# Patient Record
Sex: MALE | Race: BLACK OR AFRICAN AMERICAN | Employment: FULL TIME | URBAN - METROPOLITAN AREA
[De-identification: names, ages, dates, MRNs, and addresses within clinical notes are randomized per-mention and may not be internally consistent; named-entity substitution may affect disease eponyms.]

---

## 2024-06-28 ENCOUNTER — APPOINTMENT (OUTPATIENT)
Dept: CT IMAGING | Age: 39
End: 2024-06-28

## 2024-06-28 ENCOUNTER — HOSPITAL ENCOUNTER (EMERGENCY)
Age: 39
Discharge: HOME | End: 2024-06-28
Attending: EMERGENCY MEDICINE

## 2024-06-28 VITALS
HEART RATE: 61 BPM | BODY MASS INDEX: 33.06 KG/M2 | TEMPERATURE: 97.8 F | SYSTOLIC BLOOD PRESSURE: 134 MMHG | RESPIRATION RATE: 16 BRPM | WEIGHT: 280 LBS | OXYGEN SATURATION: 98 % | HEIGHT: 77 IN | DIASTOLIC BLOOD PRESSURE: 80 MMHG

## 2024-06-28 DIAGNOSIS — R07.89 RIGHT-SIDED CHEST WALL PAIN: Primary | ICD-10-CM

## 2024-06-28 PROCEDURE — 71250 CT THORAX DX C-: CPT

## 2024-06-28 PROCEDURE — 6360000002 HC RX W HCPCS: Performed by: EMERGENCY MEDICINE

## 2024-06-28 PROCEDURE — 99284 EMERGENCY DEPT VISIT MOD MDM: CPT

## 2024-06-28 RX ORDER — ORPHENADRINE CITRATE 30 MG/ML
60 INJECTION INTRAMUSCULAR; INTRAVENOUS EVERY 12 HOURS
Status: DISCONTINUED | OUTPATIENT
Start: 2024-06-28 | End: 2024-06-28 | Stop reason: HOSPADM

## 2024-06-28 RX ORDER — ORPHENADRINE CITRATE 100 MG/1
100 TABLET, EXTENDED RELEASE ORAL 2 TIMES DAILY
Qty: 20 TABLET | Refills: 0 | Status: SHIPPED | OUTPATIENT
Start: 2024-06-28 | End: 2024-07-08

## 2024-06-28 RX ORDER — KETOROLAC TROMETHAMINE 15 MG/ML
15 INJECTION, SOLUTION INTRAMUSCULAR; INTRAVENOUS ONCE
Status: COMPLETED | OUTPATIENT
Start: 2024-06-28 | End: 2024-06-28

## 2024-06-28 RX ORDER — NAPROXEN 500 MG/1
500 TABLET ORAL 2 TIMES DAILY
Qty: 14 TABLET | Refills: 0 | Status: SHIPPED | OUTPATIENT
Start: 2024-06-28

## 2024-06-28 RX ORDER — LIDOCAINE 50 MG/G
1 PATCH TOPICAL DAILY
Qty: 10 PATCH | Refills: 0 | Status: SHIPPED | OUTPATIENT
Start: 2024-06-28 | End: 2024-07-08

## 2024-06-28 RX ADMIN — KETOROLAC TROMETHAMINE 15 MG: 15 INJECTION, SOLUTION INTRAMUSCULAR; INTRAVENOUS at 11:05

## 2024-06-28 RX ADMIN — ORPHENADRINE CITRATE 60 MG: 30 INJECTION, SOLUTION INTRAMUSCULAR; INTRAVENOUS at 11:05

## 2024-06-28 ASSESSMENT — PAIN DESCRIPTION - LOCATION
LOCATION: RIB CAGE
LOCATION: RIB CAGE

## 2024-06-28 ASSESSMENT — PAIN DESCRIPTION - ORIENTATION
ORIENTATION: RIGHT
ORIENTATION: RIGHT

## 2024-06-28 ASSESSMENT — PAIN DESCRIPTION - DESCRIPTORS
DESCRIPTORS: ACHING
DESCRIPTORS: SHARP

## 2024-06-28 ASSESSMENT — PAIN - FUNCTIONAL ASSESSMENT
PAIN_FUNCTIONAL_ASSESSMENT: 0-10
PAIN_FUNCTIONAL_ASSESSMENT: PREVENTS OR INTERFERES SOME ACTIVE ACTIVITIES AND ADLS

## 2024-06-28 ASSESSMENT — PAIN DESCRIPTION - PAIN TYPE: TYPE: ACUTE PAIN

## 2024-06-28 ASSESSMENT — PAIN SCALES - GENERAL
PAINLEVEL_OUTOF10: 9
PAINLEVEL_OUTOF10: 10

## 2024-06-28 ASSESSMENT — PAIN DESCRIPTION - FREQUENCY: FREQUENCY: CONTINUOUS

## 2024-06-28 NOTE — ED NOTES
The patient presents to the er today with complaints of right rib pain. He reports of standing on a commercial  yesterday while mowing grass and was \" flipped off of it.\"  The call light is within reach and family are at the bedside.

## 2024-06-28 NOTE — DISCHARGE INSTRUCTIONS
Return immediately to the emergency department if you experience new or worsened symptoms, chest pain, shortness of breath, rash, fever, or for any other concerns.

## 2024-06-28 NOTE — ED PROVIDER NOTES
Emergency Department Encounter    Patient: Jaguar Almaguer  MRN: [id number]  : 1985  Date of Evaluation: 2024  ED Provider:  Daniel Gaxiola MD    MDM:    Clinical Impression:  1. Right-sided chest wall pain          Triage Chief Complaint: Rib Pain (Right rib pain  after \" flipping of a mower yesterday\"           )        I completed a structured, evidence-based clinical evaluation to screen for acute emergent condition that poses a threat to life or bodily function.      Diagnostic studies/Differential diagnosis included: (with independent interpretations \"as interpreted by me\" and tests considered but not performed) Patient presented with chest wall complaints.  Based on patient's presentation, history and physical exam presentation appears most consistent with a soft tissue injury versus rib fracture.  CT chest without contrast revealed no acute findings in the chest.  No evidence of acute pneumonia, pneumothorax, aortic dissection, acute rib fracture.  Patient provided incentive spirometer.  Patient does not have any evidence of pneumonia, pneumothorax, ACS, PE, other acute intraabdominal or intrathoracic emergency.  No evidence of additional traumatic injury at this time.  The patient understands that at this time there is no evidence for a more significant underlying process, and that early in a process of such an injury and initial workup can be falsely negative.  Patient treated as below, and they will be discharged to follow-up as an outpatient, they understand and agree with the plan, return warnings given.      Medications ordered in the ED:  ED Medication Orders (From admission, onward)      Start Ordered     Status Ordering Provider    24 1100 24 1058  orphenadrine (NORFLEX) injection 60 mg  EVERY 12 HOURS         Last MAR action: Given - by SANCHEZ WASHINGTON on 24 at 1105 DANIEL GAXIOLA    24 1100 24 1058  ketorolac (TORADOL) injection 15 mg  ONCE         Last MAR